# Patient Record
Sex: MALE | Race: WHITE | URBAN - METROPOLITAN AREA
[De-identification: names, ages, dates, MRNs, and addresses within clinical notes are randomized per-mention and may not be internally consistent; named-entity substitution may affect disease eponyms.]

---

## 2022-03-29 ENCOUNTER — OFFICE VISIT (OUTPATIENT)
Dept: ORTHOPEDIC SURGERY | Age: 57
End: 2022-03-29
Payer: COMMERCIAL

## 2022-03-29 DIAGNOSIS — M17.12 PRIMARY OSTEOARTHRITIS OF LEFT KNEE: Primary | ICD-10-CM

## 2022-03-29 DIAGNOSIS — M25.562 LEFT KNEE PAIN, UNSPECIFIED CHRONICITY: ICD-10-CM

## 2022-03-29 DIAGNOSIS — M25.561 RIGHT KNEE PAIN, UNSPECIFIED CHRONICITY: ICD-10-CM

## 2022-03-29 DIAGNOSIS — M17.11 PRIMARY OSTEOARTHRITIS OF RIGHT KNEE: ICD-10-CM

## 2022-03-29 PROCEDURE — 99203 OFFICE O/P NEW LOW 30 MIN: CPT | Performed by: ORTHOPAEDIC SURGERY

## 2022-03-29 RX ORDER — ATORVASTATIN CALCIUM 80 MG/1
TABLET, FILM COATED ORAL
COMMUNITY
Start: 2021-12-30

## 2022-03-29 RX ORDER — EVOLOCUMAB 140 MG/ML
INJECTION, SOLUTION SUBCUTANEOUS
COMMUNITY
Start: 2022-03-28

## 2022-03-29 RX ORDER — FLUTICASONE FUROATE AND VILANTEROL TRIFENATATE 100; 25 UG/1; UG/1
1 POWDER RESPIRATORY (INHALATION) DAILY
COMMUNITY
Start: 2022-02-10

## 2022-03-30 ENCOUNTER — TELEPHONE (OUTPATIENT)
Dept: ORTHOPEDIC SURGERY | Age: 57
End: 2022-03-30

## 2022-03-30 DIAGNOSIS — M17.12 PRIMARY OSTEOARTHRITIS OF LEFT KNEE: Primary | ICD-10-CM

## 2022-03-30 DIAGNOSIS — M25.562 LEFT KNEE PAIN, UNSPECIFIED CHRONICITY: Primary | ICD-10-CM

## 2022-03-30 NOTE — TELEPHONE ENCOUNTER
General Question     Subject: MRI & SYNVISC INJECTIONS  Patient and /or Facility Request:Dolores Sidhu Number: [phone]    PATIENT CALLING REGARDING MRI AND HIS INSURANCE COMPANY, AND THE SYNVISC INJECTION. PLEASE CALL BACK PATIENT AT THE ABOVE NUMBER.

## 2022-03-30 NOTE — PROGRESS NOTES
12 [facility]  History and Physical      Date:  3/29/2022    Name:  Noemy Rojas  Address:  [facility] [address] [place] [address]    :  1965      Age:   60 y.o. Chief Complaint    New Patient (L knee)      History of Present Illness:  Noemy Rojas is a 60 y.o. male who presents for evaluation of his left knee pain. Patient has a relevant past medical history of bilateral partial lateral meniscectomies. He likes to remain fairly active playing tennis, golf, and basketball. He mostly notices pain in the medial joint line of the left knee with increased activity. He does note some pain at night. He experiences occasional locking and catching but denies any sensations of major instability. The patient denies any new injury. The patient denies any catching, giving way, joint locking, numbness, paresthesias, or weakness. No past medical history on file. No past surgical history on file. No family history on file. Social History     Socioeconomic History    Marital status: Unknown     Spouse name: Not on file    Number of children: Not on file    Years of education: Not on file    Highest education level: Not on file   Occupational History    Not on file   Tobacco Use    Smoking status: Not on file    Smokeless tobacco: Not on file   Substance and Sexual Activity    Alcohol use: Not on file    Drug use: Not on file    Sexual activity: Not on file   Other Topics Concern    Not on file   Social History Narrative    Not on file     Social Determinants of Health     Financial Resource Strain:     Difficulty of Paying Living Expenses: Not on file   Food Insecurity:     Worried About Running Out of Food in the Last Year: Not on file    Love of Food in the Last Year: Not on file   Transportation Needs:     Lack of Transportation (Medical): Not on file    Lack of Transportation (Non-Medical):  Not on file   Physical Activity:     Days of Exercise per Week: Not on file    Minutes of Exercise per Session: Not on file   Stress:     Feeling of Stress : Not on file   Social Connections:     Frequency of Communication with Friends and Family: Not on file    Frequency of Social Gatherings with Friends and Family: Not on file    Attends Confucianism Services: Not on file    Active Member of [address] [place] [address] or Organizations: Not on file    Attends Club or Organization Meetings: Not on file    Marital Status: Not on file   Intimate Partner Violence:     Fear of Current or Ex-Partner: Not on file    Emotionally Abused: Not on file    Physically Abused: Not on file    Sexually Abused: Not on file   Housing Stability:     Unable to Pay for Housing in the Last Year: Not on file    Number of [place] in the Last Year: Not on file    Unstable Housing in the Last Year: Not on file       Current Outpatient Medications   Medication Sig Dispense Refill    atorvastatin (LIPITOR) 80 MG tablet TAKE 1 TABLET BY MOUTH EVERY DAY      fluticasone-vilanterol (BREO ELLIPTA) 100-25 MCG/INH AEPB inhaler Inhale 1 puff into the lungs daily      REPATHA SURECLICK 197 MG/ML SOAJ        No current facility-administered medications for this visit. Allergies   Allergen Reactions    Phenobarbital          Review of Systems:  A 14 point review of systems was completed by the patient and is available in the media section of the scanned medical record and was reviewed. Vital Signs: There were no vitals taken for this visit. General Exam:    General: Koby Ch is a healthy and well appearing 60y.o. year old male who is sitting comfortably in our office in no acute distress. Neuro: Alert & Oriented x 3,  normal,  no focal deficits noted. Normal mood & affect.   Eyes: Sclera clear  Ears: Normal external ear  Mouth: Patient wearing a mask  Pulm: Respirations unlabored and regular   Skin: Warm, well perfused      Knee Examination: Left    Inspection:  No effusion, ecchymosis, discoloration, erythema, excessive warmth. no gross deformities, no signs of infection. Palpation: There is patellofemoral crepitus, there is medial joint line tenderness. No other osseous or soft tissue tenderness around the knee. Negative calf tenderness    Range of Motion:  0-130 degrees without pain or difficulty. Appropriate patella mobility    Strength: Grossly    Special Tests:  No ligament instability, valgus and varus stress test are stable at 0 and 30°. Lachman's exhibits a solid endpoint. Negative posterior drawer. Negative Homans sign. Positive flexion test with medial joint line discomfort. Positive Vinny's    Gait: Non antalgic, without the use of assistive devices    Alignment: Varus deformity    Neuro: Sensation equal bilateral lower extremities    Vascular: 2+ posterior tibialis pulse      Contralateral Knee Comparison Examination: Right    Inspection:  No effusion, ecchymosis, discoloration, erythema, excessive warmth. no gross deformities, no signs of infection. Palpation: There is patellofemoral crepitus, there is no joint line tenderness. No other osseous or soft tissue tenderness around the knee. Negative calf tenderness    Range of Motion:  0-130 degrees without pain or difficulty. Appropriate patellar mobility    Strength: Grossly intact    Special Tests: No ligament instability, valgus and varus stress test are stable at 0 and 30°. Lachman's exhibits a solid endpoint. Negative posterior drawer. Negative Homans sign    Gait:  Non antalgic, without the use of assistive devices    Alignment: Varus deformity    Neuro: Sensation equal bilateral lower extremities    Vascular: 2+ posterior tibialis pulse      Radiology:   Previously obtain imaging reviewed. X-rays obtained and reviewed in office: AP and merchant view of both knees and a lateral of the left.      Impression: No fractures, dislocations, visible tumors, or signs of acute trauma. Patient exhibits decreased joint space in the medial and lateral femoral-tibial compartments bilaterally. Lateral compartment of the right knee exhibits 100% wear while the lateral compartment the left knee is approximately 50% worn. Patient has decreased joint spacing in the patellofemoral joints bilaterally. Marcelino-Ita ratio of approximately 1. Spurring noted in the patellofemoral joint. Office Procedures:  Orders Placed This Encounter   Procedures    XR KNEE LEFT (3 VIEWS)     Standing Status:   Future     Number of Occurrences:   1     Standing Expiration Date:   3/29/2023     Order Specific Question:   Reason for exam:     Answer:   pain    XR KNEE RIGHT (3 VIEWS)     Standing Status:   Future     Number of Occurrences:   1     Standing Expiration Date:   3/29/2023     Order Specific Question:   Reason for exam:     Answer:   pain            Assessment: Patient is a 60 y.o. male presenting to the office for evaluation of his left knee pain. This patient is a working diagnosis of osteoarthritis with a possible medial meniscus tear in the left knee. Encounter Diagnoses   Name Primary?  Primary osteoarthritis of left knee Yes    Primary osteoarthritis of right knee     Right knee pain, unspecified chronicity     Left knee pain, unspecified chronicity        Medical decision making:  Patient's workup and evaluation were reviewed with the patient in the office today. Imaging reviewed with the patient. Given the patient's history and physical exam I believe the reasonable degree of medical certainty he may have sustained a tear to the medial meniscus of the left knee. For this reason I believe the patient has medical necessity to obtain an MRI of the left knee to evaluate for the previously mentioned pathology. Patient was otherwise educated on conservative therapy for his condition as detailed the treatment plan below.   We will pre-CERT viscosupplementation injections to address his pre-existing osteoarthritis. He should follow-up after receiving MRI imaging to review the results and further coordinate care     All the patient's questions were answered while in the clinic. The patient is understanding of all instructions and agrees with the plan. Treatment Plan:    1. MRI left knee without contrast  2. Activity modification/Rest   3. Ice 20 minutes ever 1-2 hours PRN  4. Take medications as prescribed/instructed  5. Elevation   6. Lightweight exercise/low impact exercise  7. Appropriate diet/weight management      Follow up: After receiving an MRI and as needed    This patient exhibits moderate complexity for obtaining an independent history, reviewing past medical records, independent interpretation/review of diagnostic imaging, and further coordinating care. The patient exhibits low risk given that the patient is being treated with conservative therapy. Approximately 30 minutes were spent reviewing past medical records, imaging, educating the patient, and coordinating care. Jo Ann Nelson PA-C    Physician Assistant - Certified    64/90/46 4:11 PM    During this examination, Jo Ann CONNELLY [place], functioned as a scribe for Dr. Steven Burleson. This dictation was performed with a verbal recognition program (DRAGON) and it was checked for errors. It is possible that there are still dictated errors within this office note. If so, please bring any errors to my attention for an addendum. All efforts were made to ensure that this office note is accurate. This dictation was performed with a verbal recognition program (DRAGON) and it was checked for errors. It is possible that there are still dictated errors within this office note. If so, please bring any errors to my attention for an addendum. All efforts were made to ensure that this office note is accurate.     Supervising Physician Attestation:  I, Dr. Steven Burleson, personally performed the services described in this documentation as scribed above, and it is both accurate and complete and I agree with all pertinent clinical information. I personally interviewed the patient and performed a physical examination and medical decision making. I discussed the patient's condition and treatment options and have  reviewed and agree with the past medical, family and social history unless otherwise noted. All of the patient's questions were answered.       Board Certified Orthopaedic Surgeon  [address] [facility] and [address] and [address] [employer]  Professor of [address]